# Patient Record
(demographics unavailable — no encounter records)

---

## 2024-12-06 NOTE — ASSESSMENT
[FreeTextEntry1] : From the history and examination it appears that the patient etiology of the pain in the calf and discomfort may be arising from the lower back.  However because of the clinical exam we will obtain a venous Doppler to rule out DVT and also ALICIA PVRs of both lower extremities because of her decreased palpable pedal pulses.  I have discussed with the patient at length and I explained to her about the about test we will schedule them in the office and follow the patient after the above test are done in next few weeks.  The suspicion of DVT is very low.

## 2024-12-06 NOTE — PLAN
[TextEntry] : Will obtain ALICIA PVRs and venous Doppler of both legs and follow the patient after 2 weeks.

## 2024-12-06 NOTE — HISTORY OF PRESENT ILLNESS
[FreeTextEntry1] : This is a 66-year-old female who has come to the office referred by her primary care physician for calf and leg pains and complains of swelling in the calf bilaterally for past few weeks.  According to the history obtained from the patient she never had noticed SO big and large and has pain in both legs behind the knee and the calf radiating up the thighs.  She also has a significant history of spinal stenosis multiple levels as well as a right hip replacement and chronic bilateral knee pains with osteoarthritis.  Patient does not notice any swelling on the feet or the ankle but she thinks her legs were never so large.  Patient has a history of obesity with BMI of 34.  She denies any tingling numbness in the toes.  Patient is sedentary does not do any significant exercises spends a large amount of time on computer.  She denies any previous history of DVT denies smoking

## 2024-12-06 NOTE — PHYSICAL EXAM
[JVD] : no jugular venous distention  [Normal Thyroid] : the thyroid was normal [Carotid Bruits] : no carotid bruits [Normal Breath Sounds] : Normal breath sounds [Normal Heart Sounds] : normal heart sounds [Right Carotid Bruit] : no bruit heard over the right carotid [Left Carotid Bruit] : no bruit heard over the left carotid [2+] : right 2+ [Right Femoral Bruit] : no bruit heard over the right femoral artery [Left Femoral Bruit] : no bruit heard over the left femoral artery [1+] : left 1+ [0] : left 0 [Ankle Swelling (On Exam)] : not present [Varicose Veins Of Lower Extremities] : not present [] : not present [Abdomen Masses] : No abdominal masses [Abdomen Tenderness] : ~T ~M No abdominal tenderness [Tender] : was nontender [Stool Sample Taken] : No stool obtained  on rectal exam [No Rash or Lesion] : No rash or lesion [Purpura] : no purpura  [Petechiae] : no petechiae [Skin Ulcer] : no ulcer [Skin Induration] : no induration [Alert] : alert [Oriented to Person] : oriented to person [Oriented to Place] : oriented to place [Oriented to Time] : oriented to time [Anxious] : anxious [de-identified] : Well-built well-nourished obese [de-identified] : Within normal limits [de-identified] : Within normal limits [TextEntry] : Patient seen and examined in sitting supine and standing positions.  Head and neck upper extremity exams are normal respiratory cardiac exams are normal in both feet and ankles without any swelling both calfs are examined in detail and I cannot appreciate any swelling in the calf there is no calf tenderness no pretibial edema no cellulitis infection no tenderness and compartments are soft.  There are no varicose veins no venous stasis no superficial thrombophlebitis.  Patient does have tenderness in the both knee which are bony tenderness consistent with her osteoarthritis.  Both feet are warm to touch but both dorsalis pedis and posterior tibial pulses are very difficult to palpate and the cap refill is less than 3 seconds

## 2024-12-20 NOTE — DATA REVIEWED
[FreeTextEntry1] : ALICIA PVRs are reviewed from today which are normal as well as a venous Doppler is negative for DVT both reports discussed with patient

## 2024-12-20 NOTE — ASSESSMENT
[FreeTextEntry1] : Patient with a normal venous Dopplers as well as ALICIA PVRs and discomfort in the legs most likely arising from spinal stenosis and radiculopathy rather than any vascular etiology at this stage.

## 2024-12-20 NOTE — HISTORY OF PRESENT ILLNESS
[FreeTextEntry1] : The patient has come for the follow-up of her bilateral lower extremity pain and discomfort.  Patient underwent a venous Doppler of both lower extremities today as well as ALICIA PVRs of both lower extremity.  Both test reports are reviewed and discussed with the patient.  The venous Doppler is negative for DVT as well as ALICIA PVRs have normal circulation to both feet and the ankles.  The patient does have discomfort behind the knees and radiating to thighs especially the right side

## 2024-12-20 NOTE — PLAN
[TextEntry] : I have discussed with patient at length regarding the vascular studies and normal ALICIA PVRs and venous Dopplers.  At this stage there is no need for the follow-up in my office.  I have discussed with patient and she can follow-up with her primary care physician if the pain discomfort continues should see a spine surgeon physician for her low back pain and discomfort.  No further vascular workup or treatment is needed at this time.

## 2025-02-03 NOTE — HEALTH RISK ASSESSMENT
[No] : No [0] : 2) Feeling down, depressed, or hopeless: Not at all (0) [PHQ-2 Negative - No further assessment needed] : PHQ-2 Negative - No further assessment needed [Never] : Never [Patient reported mammogram was normal] : Patient reported mammogram was normal [Patient reported colonoscopy was normal] : Patient reported colonoscopy was normal [Employed] : employed [[id number]] : 0 [MammogramDate] : 08/24 [ColonoscopyDate] : 02/22

## 2025-02-03 NOTE — PLAN
[FreeTextEntry1] : # anxiety  - taking 1 mg xanax add 0.5 mg during day temporarily to see psych  # TMJ: mouth guard  # HTN: BP acceptable continue amlodipine and losartan  # CPE: blood work utd, mammo/colonoscopy, utd shingrix, declined prevnar

## 2025-02-03 NOTE — HISTORY OF PRESENT ILLNESS
[FreeTextEntry1] : CPE  [de-identified] : 68 yo hx anxiety, HTN presenting for CPe.  has had LE swelling saw vascular. unsure of cause of swelling.  has chronic anxiety taking xanax just at night to sleep. has chronic earaches thinks she may have TMJ dysfunction. chewing more on the other side. went to specialist by ENT was prescribed ear drops which did not help much. xanax was prescribe and weaned down to 1 mg at night PRN. has not tried SSRIs due to SE from father. started on mounjaro - got constipation self discontinued.  had chronic back pain testing positive for SLE was placed on cymbalta, had tremors and hand shaking - self discontinued.

## 2025-07-22 NOTE — HISTORY OF PRESENT ILLNESS
[No Pertinent Cardiac History] : no history of aortic stenosis, atrial fibrillation, coronary artery disease, recent myocardial infarction, or implantable device/pacemaker [Asthma] : asthma [No Adverse Anesthesia Reaction] : no adverse anesthesia reaction in self or family member [(Patient denies any chest pain, claudication, dyspnea on exertion, orthopnea, palpitations or syncope)] : Patient denies any chest pain, claudication, dyspnea on exertion, orthopnea, palpitations or syncope [COPD] : no COPD [Sleep Apnea] : no sleep apnea [Smoker] : not a smoker [Self] : no previous adverse anesthesia reaction [Chronic Anticoagulation] : no chronic anticoagulation [Chronic Kidney Disease] : no chronic kidney disease [Diabetes] : no diabetes [FreeTextEntry1] : D& C Hysteroscopy and polypectomy  [FreeTextEntry2] : 07/29/25 [FreeTextEntry3] : Dr. Johnson Rut [FreeTextEntry4] : Jul 21 2025 12:00PM Ms. HAWK BRADLEY is a 67 year-old female with HTN, HLD, Obesity, GERD, presents today for pre op clearance

## 2025-07-22 NOTE — ASSESSMENT
[Patient NOT optimized for Surgery at this time] : Patient not optimized for surgery at this time [As per surgery] : as per surgery [Patient Optimized for Surgery] : Patient optimized for surgery [FreeTextEntry4] : # Pre Op Clearance  - EKG Sinus rhythm, no acute changes - Labs CBC/CMP/Coags - pending  - BP at goal - Patient RCI score Class 1 Risk - low risk for a low risk procedure - Patient medically optimized to proceed with planned procedure upon normal labs.